# Patient Record
Sex: FEMALE | Race: WHITE | NOT HISPANIC OR LATINO | URBAN - NONMETROPOLITAN AREA
[De-identification: names, ages, dates, MRNs, and addresses within clinical notes are randomized per-mention and may not be internally consistent; named-entity substitution may affect disease eponyms.]

---

## 2020-01-31 ENCOUNTER — IMPORTED ENCOUNTER (OUTPATIENT)
Dept: URBAN - NONMETROPOLITAN AREA CLINIC 1 | Facility: CLINIC | Age: 85
End: 2020-01-31

## 2020-01-31 PROBLEM — H18.51: Noted: 2020-01-31

## 2020-01-31 PROBLEM — Z96.1: Noted: 2020-01-31

## 2020-01-31 PROBLEM — H52.4: Noted: 2018-01-25

## 2020-01-31 PROBLEM — H35.3132: Noted: 2017-01-16

## 2020-01-31 PROBLEM — H52.13: Noted: 2018-01-25

## 2020-01-31 PROBLEM — H16.223: Noted: 2020-01-31

## 2020-01-31 PROBLEM — H52.223: Noted: 2018-01-25

## 2020-01-31 PROCEDURE — 92014 COMPRE OPH EXAM EST PT 1/>: CPT

## 2020-01-31 NOTE — PATIENT DISCUSSION
AMD - dry (+)Progression OU -Explained dry AMD and advised that there are no treatments available at this time.-Continue AREDS 2 MVT. -Continue Amsler grid monitoring daily. Pt is to contact our office if any changes are noted. Fuchs Dystrophy-discussed with patient- continue dropspseudophakia oumonitor

## 2022-04-10 ASSESSMENT — TONOMETRY
OS_IOP_MMHG: 18
OD_IOP_MMHG: 18

## 2022-04-10 ASSESSMENT — VISUAL ACUITY
OS_SC: 20/50
OU_CC: 20/40
OS_CC: 20/70
OD_SC: 20/70